# Patient Record
Sex: FEMALE | URBAN - METROPOLITAN AREA
[De-identification: names, ages, dates, MRNs, and addresses within clinical notes are randomized per-mention and may not be internally consistent; named-entity substitution may affect disease eponyms.]

---

## 2022-07-01 ENCOUNTER — OFFICE VISIT (OUTPATIENT)
Dept: URBAN - METROPOLITAN AREA CLINIC 3 | Facility: CLINIC | Age: 62
End: 2022-07-01

## 2022-07-01 DIAGNOSIS — E11.3312 TYPE 2 DIABETES MELLITUS WITH MODERATE NONPROLIFERATIVE DIABETIC RETINOPATHY WITH MACULAR EDEMA, LEFT EYE: ICD-10-CM

## 2022-07-01 DIAGNOSIS — E11.3413 DIABETES MELLITUS TYPE 2 WITH SEVERE NON-PROLIFERATIVE RETINOPATHY WITH MACULAR EDEMA, BILATERAL: Primary | ICD-10-CM

## 2022-07-01 DIAGNOSIS — H43.313 VITREOUS MEMBRANES AND STRANDS, BILATERAL: ICD-10-CM

## 2022-07-01 DIAGNOSIS — H25.813 COMBINED FORMS OF AGE-RELATED CATARACT, BILATERAL: ICD-10-CM

## 2022-07-01 DIAGNOSIS — H54.8 LEGAL BLINDNESS, AS DEFINED IN USA: ICD-10-CM

## 2022-07-01 DIAGNOSIS — H11.153 PINGUECULA, BILATERAL: ICD-10-CM

## 2022-07-01 PROCEDURE — 92250 FUNDUS PHOTOGRAPHY W/I&R: CPT | Performed by: OPTOMETRIST

## 2022-07-01 PROCEDURE — 99204 OFFICE O/P NEW MOD 45 MIN: CPT | Performed by: OPTOMETRIST

## 2022-07-01 PROCEDURE — 92134 CPTRZ OPH DX IMG PST SGM RTA: CPT | Performed by: OPTOMETRIST

## 2022-07-01 ASSESSMENT — INTRAOCULAR PRESSURE
OD: 25
OS: 24

## 2022-07-01 NOTE — IMPRESSION/PLAN
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 Plan: Fundus photos taken. Discussed the pathophysiology of diabetes and its effect on the eye. Stressed the importance of strong glucose control. Advised of importance of scheduled dilated examinations, and to contact us immediately for any problems or concerns. Patient was referred to a retina specialist for further evaluation and management of the Macular Edema.

## 2022-07-01 NOTE — IMPRESSION/PLAN
Impression: Diabetes mellitus Type 2 with severe non-proliferative retinopathy with macular edema, bilateral: R25.5445. Plan: Fundus photos taken. MOCT done today confirms macular edema OS and atrophy OD. Discussed the pathophysiology of diabetes and its effect on the eye. Stressed the importance of strong glucose control. Advised of importance of scheduled dilated examinations, and to contact us immediately for any problems or concerns.  Refer to retinal specialist.

## 2022-07-01 NOTE — IMPRESSION/PLAN
Impression: Legal blindness, as defined in [place]: H54.8. Plan: Right eye due to vascular event.  Refer to retinal specialist.

## 2022-09-08 ENCOUNTER — OFFICE VISIT (OUTPATIENT)
Dept: URBAN - METROPOLITAN AREA CLINIC 3 | Facility: CLINIC | Age: 62
End: 2022-09-08
Payer: COMMERCIAL

## 2022-09-08 DIAGNOSIS — H11.153 PINGUECULA, BILATERAL: ICD-10-CM

## 2022-09-08 DIAGNOSIS — H25.13 AGE-RELATED NUCLEAR CATARACT, BILATERAL: ICD-10-CM

## 2022-09-08 DIAGNOSIS — H40.89 OTHER SPECIFIED GLAUCOMA: Primary | ICD-10-CM

## 2022-09-08 PROCEDURE — 99212 OFFICE O/P EST SF 10 MIN: CPT | Performed by: OPTOMETRIST

## 2022-09-08 PROCEDURE — 92020 GONIOSCOPY: CPT | Performed by: OPTOMETRIST

## 2022-09-08 ASSESSMENT — INTRAOCULAR PRESSURE
OS: 18
OD: 79
OD: 48
OS: 22

## 2022-09-08 NOTE — IMPRESSION/PLAN
Impression: Other specified glaucoma: H40.89. 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 IOP 79/22 today. Gave in office diamox 500 mg @3:58 pm and Combigan 1 drop @3:58 pm & @ 5:09pm. Gave patient 1 pill of Diamox 250 mg to take QHS PO. IOP decreased 42/18. Gave sample of Combigan to use at bedtime (LOT:  EXP: 1/23) Referred to Glaucoma specialist for evaluation and management ASAP. (spoke to [facility] Patient will see  tomorrow @8:00 am)

## 2022-10-06 ENCOUNTER — OFFICE VISIT (OUTPATIENT)
Dept: URBAN - METROPOLITAN AREA CLINIC 3 | Facility: CLINIC | Age: 62
End: 2022-10-06
Payer: COMMERCIAL

## 2022-10-06 DIAGNOSIS — E11.3413 DIABETES MELLITUS TYPE 2 WITH SEVERE NON-PROLIFERATIVE RETINOPATHY WITH MACULAR EDEMA, BILATERAL: ICD-10-CM

## 2022-10-06 DIAGNOSIS — H54.8 LEGAL BLINDNESS, AS DEFINED IN USA: ICD-10-CM

## 2022-10-06 DIAGNOSIS — H25.813 COMBINED FORMS OF AGE-RELATED CATARACT, BILATERAL: ICD-10-CM

## 2022-10-06 DIAGNOSIS — H40.89 OTHER SPECIFIED GLAUCOMA: Primary | ICD-10-CM

## 2022-10-06 PROCEDURE — 99212 OFFICE O/P EST SF 10 MIN: CPT | Performed by: OPTOMETRIST

## 2022-10-06 ASSESSMENT — INTRAOCULAR PRESSURE
OS: 20
OD: 39

## 2022-10-06 NOTE — IMPRESSION/PLAN
Impression: Other specified glaucoma: H40.89. 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 Continue care with Dr. Benedicto Lovell as scheduled 11/5/2022 and Dr. Merlin Roux as scheduled for retina.

## 2022-10-06 NOTE — IMPRESSION/PLAN
Impression: Diabetes mellitus Type 2 with severe non-proliferative retinopathy with macular edema, bilateral: L32.3011. Plan: Discussed the pathophysiology of diabetes and its effect on the eye. Stressed the importance of strong glucose control. Advised of importance of scheduled dilated examinations, and to contact us immediately for any problems or concerns. Patient was referred to a retina specialist for further evaluation and management.